# Patient Record
Sex: MALE | Race: BLACK OR AFRICAN AMERICAN | NOT HISPANIC OR LATINO | URBAN - METROPOLITAN AREA
[De-identification: names, ages, dates, MRNs, and addresses within clinical notes are randomized per-mention and may not be internally consistent; named-entity substitution may affect disease eponyms.]

---

## 2018-03-01 ENCOUNTER — OUTPATIENT (OUTPATIENT)
Dept: OUTPATIENT SERVICES | Facility: HOSPITAL | Age: 33
LOS: 1 days | End: 2018-03-01
Payer: MEDICAID

## 2018-03-01 PROCEDURE — G9001: CPT

## 2018-03-16 DIAGNOSIS — R69 ILLNESS, UNSPECIFIED: ICD-10-CM

## 2020-12-18 ENCOUNTER — EMERGENCY (EMERGENCY)
Facility: HOSPITAL | Age: 35
LOS: 1 days | Discharge: LEFT BEFORE DISCHARGE | End: 2020-12-18
Attending: EMERGENCY MEDICINE | Admitting: EMERGENCY MEDICINE
Payer: MEDICAID

## 2020-12-18 VITALS
RESPIRATION RATE: 16 BRPM | SYSTOLIC BLOOD PRESSURE: 224 MMHG | DIASTOLIC BLOOD PRESSURE: 112 MMHG | TEMPERATURE: 98 F | HEART RATE: 85 BPM | WEIGHT: 315 LBS | HEIGHT: 74 IN | OXYGEN SATURATION: 98 %

## 2020-12-18 LAB
ALBUMIN SERPL ELPH-MCNC: 3.5 G/DL — SIGNIFICANT CHANGE UP (ref 3.3–5)
ALP SERPL-CCNC: 91 U/L — SIGNIFICANT CHANGE UP (ref 40–120)
ALT FLD-CCNC: 41 U/L — SIGNIFICANT CHANGE UP (ref 12–78)
ANION GAP SERPL CALC-SCNC: 7 MMOL/L — SIGNIFICANT CHANGE UP (ref 5–17)
AST SERPL-CCNC: 24 U/L — SIGNIFICANT CHANGE UP (ref 15–37)
BASOPHILS # BLD AUTO: 0.05 K/UL — SIGNIFICANT CHANGE UP (ref 0–0.2)
BASOPHILS NFR BLD AUTO: 0.4 % — SIGNIFICANT CHANGE UP (ref 0–2)
BILIRUB SERPL-MCNC: 0.3 MG/DL — SIGNIFICANT CHANGE UP (ref 0.2–1.2)
BUN SERPL-MCNC: 18 MG/DL — SIGNIFICANT CHANGE UP (ref 7–23)
CALCIUM SERPL-MCNC: 9.1 MG/DL — SIGNIFICANT CHANGE UP (ref 8.5–10.1)
CHLORIDE SERPL-SCNC: 105 MMOL/L — SIGNIFICANT CHANGE UP (ref 96–108)
CO2 SERPL-SCNC: 26 MMOL/L — SIGNIFICANT CHANGE UP (ref 22–31)
CREAT SERPL-MCNC: 1.1 MG/DL — SIGNIFICANT CHANGE UP (ref 0.5–1.3)
D DIMER BLD IA.RAPID-MCNC: <150 NG/ML DDU — SIGNIFICANT CHANGE UP
EOSINOPHIL # BLD AUTO: 0.22 K/UL — SIGNIFICANT CHANGE UP (ref 0–0.5)
EOSINOPHIL NFR BLD AUTO: 1.7 % — SIGNIFICANT CHANGE UP (ref 0–6)
GLUCOSE SERPL-MCNC: 101 MG/DL — HIGH (ref 70–99)
HCT VFR BLD CALC: 44.3 % — SIGNIFICANT CHANGE UP (ref 39–50)
HGB BLD-MCNC: 14.4 G/DL — SIGNIFICANT CHANGE UP (ref 13–17)
IMM GRANULOCYTES NFR BLD AUTO: 0.5 % — SIGNIFICANT CHANGE UP (ref 0–1.5)
LIDOCAIN IGE QN: 132 U/L — SIGNIFICANT CHANGE UP (ref 73–393)
LYMPHOCYTES # BLD AUTO: 26.1 % — SIGNIFICANT CHANGE UP (ref 13–44)
LYMPHOCYTES # BLD AUTO: 3.41 K/UL — HIGH (ref 1–3.3)
MAGNESIUM SERPL-MCNC: 2.4 MG/DL — SIGNIFICANT CHANGE UP (ref 1.6–2.6)
MCHC RBC-ENTMCNC: 27.1 PG — SIGNIFICANT CHANGE UP (ref 27–34)
MCHC RBC-ENTMCNC: 32.5 GM/DL — SIGNIFICANT CHANGE UP (ref 32–36)
MCV RBC AUTO: 83.3 FL — SIGNIFICANT CHANGE UP (ref 80–100)
MONOCYTES # BLD AUTO: 0.88 K/UL — SIGNIFICANT CHANGE UP (ref 0–0.9)
MONOCYTES NFR BLD AUTO: 6.7 % — SIGNIFICANT CHANGE UP (ref 2–14)
NEUTROPHILS # BLD AUTO: 8.44 K/UL — HIGH (ref 1.8–7.4)
NEUTROPHILS NFR BLD AUTO: 64.6 % — SIGNIFICANT CHANGE UP (ref 43–77)
NRBC # BLD: 0 /100 WBCS — SIGNIFICANT CHANGE UP (ref 0–0)
NT-PROBNP SERPL-SCNC: 231 PG/ML — HIGH (ref 0–125)
PLATELET # BLD AUTO: 290 K/UL — SIGNIFICANT CHANGE UP (ref 150–400)
POTASSIUM SERPL-MCNC: 4.1 MMOL/L — SIGNIFICANT CHANGE UP (ref 3.5–5.3)
POTASSIUM SERPL-SCNC: 4.1 MMOL/L — SIGNIFICANT CHANGE UP (ref 3.5–5.3)
PROT SERPL-MCNC: 7.9 G/DL — SIGNIFICANT CHANGE UP (ref 6–8.3)
RBC # BLD: 5.32 M/UL — SIGNIFICANT CHANGE UP (ref 4.2–5.8)
RBC # FLD: 14.2 % — SIGNIFICANT CHANGE UP (ref 10.3–14.5)
SODIUM SERPL-SCNC: 138 MMOL/L — SIGNIFICANT CHANGE UP (ref 135–145)
TROPONIN I SERPL-MCNC: <.015 NG/ML — SIGNIFICANT CHANGE UP (ref 0.01–0.04)
WBC # BLD: 13.06 K/UL — HIGH (ref 3.8–10.5)
WBC # FLD AUTO: 13.06 K/UL — HIGH (ref 3.8–10.5)

## 2020-12-18 PROCEDURE — 99285 EMERGENCY DEPT VISIT HI MDM: CPT

## 2020-12-18 PROCEDURE — 93010 ELECTROCARDIOGRAM REPORT: CPT

## 2020-12-18 PROCEDURE — 71045 X-RAY EXAM CHEST 1 VIEW: CPT | Mod: 26

## 2020-12-18 PROCEDURE — 73030 X-RAY EXAM OF SHOULDER: CPT | Mod: 26,RT

## 2020-12-18 RX ORDER — MORPHINE SULFATE 50 MG/1
4 CAPSULE, EXTENDED RELEASE ORAL ONCE
Refills: 0 | Status: DISCONTINUED | OUTPATIENT
Start: 2020-12-18 | End: 2020-12-18

## 2020-12-18 RX ORDER — LABETALOL HCL 100 MG
10 TABLET ORAL ONCE
Refills: 0 | Status: COMPLETED | OUTPATIENT
Start: 2020-12-18 | End: 2020-12-18

## 2020-12-18 RX ADMIN — MORPHINE SULFATE 4 MILLIGRAM(S): 50 CAPSULE, EXTENDED RELEASE ORAL at 22:56

## 2020-12-18 RX ADMIN — MORPHINE SULFATE 4 MILLIGRAM(S): 50 CAPSULE, EXTENDED RELEASE ORAL at 22:22

## 2020-12-18 RX ADMIN — MORPHINE SULFATE 4 MILLIGRAM(S): 50 CAPSULE, EXTENDED RELEASE ORAL at 22:47

## 2020-12-18 RX ADMIN — MORPHINE SULFATE 4 MILLIGRAM(S): 50 CAPSULE, EXTENDED RELEASE ORAL at 23:19

## 2020-12-18 RX ADMIN — Medication 10 MILLIGRAM(S): at 23:42

## 2020-12-18 NOTE — ED ADULT NURSE NOTE - OBJECTIVE STATEMENT
Pt comes from triage. Pt reports right shoulder pain radiating down arm for the past month, describes pain and throbbing with tightness to fingers. Pt able to move arm and fingers. Pt breathing easy, unlabored, no signs of distress.

## 2020-12-18 NOTE — ED ADULT TRIAGE NOTE - CHIEF COMPLAINT QUOTE
patient came in ED with c/o elevated BP X noted today and right arm pain X a month. sent by Dr Lynn.

## 2020-12-18 NOTE — ED PROVIDER NOTE - CARE PROVIDER_API CALL
All Delaney  CARDIOVASCULAR DISEASE  [address], [address]  [place] [address]  Phone: [phone]  Fax: [phone]  Follow Up Time: 1-3 Days    Dom Gonzalez  ORTHOPAEDIC SURGERY  [address], [address]  [place] [address]  Phone: [phone]  Fax: [phone]  Follow Up Time: 4-6 Days

## 2020-12-18 NOTE — ED PROVIDER NOTE - OBJECTIVE STATEMENT
Pt is a 36 yo male smoker, hx htn in past on no meds, pt p/w several months of progressive right shoulder pain down right arm, no weakness,. pt went to [facility] for evaluation and bp was very high and came to er.  pt state pain 9/10,  pt sleeps on right shoulder, no known trauma, pt states some pain with rom. pt denies radiation to chest, back, legs , no numbness or weakness. pt states pain now severe, no ha.

## 2020-12-18 NOTE — ED PROVIDER NOTE - PROGRESS NOTE DETAILS
pain improved to 4/10 but pt still with elevated hbp. pt notified that his blood pressure is severely high and is an emergent threat to his life at this level.  pt recommended ct of chest to ro aortic pathology and further bp management as well as cardiology consult in am to arrange blood pressure regimen for patient as well as follow up. pt is aware that refusing such treatment may lead to stroke, intr pt notified that his blood pressure is severely high and is an emergent threat to his life at this level.  pt recommended ct of chest to ro aortic pathology and further bp management as well as cardiology consult in am to arrange blood pressure regimen for patient as well as follow up. pt is aware that refusing such treatment may lead to stroke, intracranial hemorrhage, renal failure and hemodialysis, aortic tear, heart attack and even death.  pt refuses to stay for treatment despite these risks.

## 2020-12-18 NOTE — ED PROVIDER NOTE - PATIENT PORTAL LINK FT
You can access the FollowMyHealth Patient Portal offered by [facility] by registering at the following website: [email or web address]. By joining [employer]’s FollowMyHealth portal, you will also be able to view your health information using other applications (apps) compatible with our system.

## 2020-12-18 NOTE — ED PROVIDER NOTE - MUSCULOSKELETAL, MLM
Spine appears normal, range of motion is not limited, right shoulder ttp over biceps tenden, anterior rotator cuff region

## 2020-12-18 NOTE — ED PROVIDER NOTE - PROVIDER TOKENS
PROVIDER:[TOKEN:[[id number]],FOLLOWUP:[1-3 Days]],PROVIDER:[TOKEN:[[id number]],FOLLOWUP:[4-6 Days]]

## 2020-12-18 NOTE — ED PROVIDER NOTE - ATTESTATION, MLM
Bladder scan showing 700 cc, patient refusing to be straight cath or void. States that will void soon. Education proved.    I have reviewed and confirmed nurses' notes for patient's medications, allergies, medical history, and surgical history.

## 2020-12-18 NOTE — ED PROVIDER NOTE - CLINICAL SUMMARY MEDICAL DECISION MAKING FREE TEXT BOX
Pt is 34 yo male with progressive right shoulder pain over month, no trauma, pt with severe htn,  pan controlled but bp remained, check trop dd x 1, cr, cta ro dissection, xray shoulder ok,  pain likely from shoulder,  manage bp,  if cta neg, d/c on bp meds, and cotho fu as outpt.  if bp hard to control,  card consult in am

## 2020-12-18 NOTE — ED PROVIDER NOTE - CONSTITUTIONAL, MLM
obese, large, male, awake, alert, oriented to person, place, time/situation and in no pain, anxious normal...

## 2020-12-18 NOTE — ED PROVIDER NOTE - NSFOLLOWUPINSTRUCTIONS_ED_ALL_ED_FT
sling for shoulder pain,   FOLLOW UP IMMEDIATELY WITH YOUR PRIMARY OR DR DUKES OF CARDIOLOGY FOR BLOOD PRESSURE CONTROL.  take tylenol as directed over the counter for pain,   call ortho for follow up of shoulder pain within one week.  return for headache, chest pain, slurred speech, numbness, weakness. pt notified that his blood pressure is severely high and is an emergent threat to his life at this level.  pt recommended ct of chest to ro aortic pathology and further bp management as well as cardiology consult in am to arrange blood pressure regimen for patient as well as follow up. pt is aware that refusing such treatment may lead to stroke, intracranial hemorrhage, renal failure and hemodialysis, aortic tear, heart attack and even death.  pt refuses to stay for treatment despite these risks.

## 2020-12-19 VITALS
TEMPERATURE: 98 F | DIASTOLIC BLOOD PRESSURE: 125 MMHG | OXYGEN SATURATION: 98 % | HEART RATE: 71 BPM | SYSTOLIC BLOOD PRESSURE: 217 MMHG | RESPIRATION RATE: 18 BRPM

## 2020-12-19 PROCEDURE — 73030 X-RAY EXAM OF SHOULDER: CPT

## 2020-12-19 PROCEDURE — 36415 COLL VENOUS BLD VENIPUNCTURE: CPT

## 2020-12-19 PROCEDURE — 96375 TX/PRO/DX INJ NEW DRUG ADDON: CPT

## 2020-12-19 PROCEDURE — 99284 EMERGENCY DEPT VISIT MOD MDM: CPT | Mod: 25

## 2020-12-19 PROCEDURE — 85379 FIBRIN DEGRADATION QUANT: CPT

## 2020-12-19 PROCEDURE — 84484 ASSAY OF TROPONIN QUANT: CPT

## 2020-12-19 PROCEDURE — 83880 ASSAY OF NATRIURETIC PEPTIDE: CPT

## 2020-12-19 PROCEDURE — 83690 ASSAY OF LIPASE: CPT

## 2020-12-19 PROCEDURE — 80053 COMPREHEN METABOLIC PANEL: CPT

## 2020-12-19 PROCEDURE — 83735 ASSAY OF MAGNESIUM: CPT

## 2020-12-19 PROCEDURE — 85025 COMPLETE CBC W/AUTO DIFF WBC: CPT

## 2020-12-19 PROCEDURE — 93005 ELECTROCARDIOGRAM TRACING: CPT

## 2020-12-19 PROCEDURE — 96376 TX/PRO/DX INJ SAME DRUG ADON: CPT

## 2020-12-19 PROCEDURE — 71045 X-RAY EXAM CHEST 1 VIEW: CPT

## 2020-12-19 PROCEDURE — 96374 THER/PROPH/DIAG INJ IV PUSH: CPT

## 2020-12-19 RX ORDER — HYDRALAZINE HCL 50 MG
10 TABLET ORAL ONCE
Refills: 0 | Status: COMPLETED | OUTPATIENT
Start: 2020-12-19 | End: 2020-12-19

## 2020-12-19 RX ADMIN — Medication 10 MILLIGRAM(S): at 00:41

## 2020-12-19 NOTE — ED ADULT NURSE REASSESSMENT NOTE - NS ED NURSE REASSESS COMMENT FT1
Pt does not want to stay until the morning for cardiology consult, declining CT scans as well. MD Rosario at bedside to explain risks of leaving against medical advice, pt verbalized understanding, signed AMA form. Pt received discharge papers, IV removed. Pt ambulatory with a steady gait, being driven home by mother.

## 2021-06-10 NOTE — ED ADULT NURSE NOTE - DISTAL EXTREMITY CAPILLARY REFILL
Chief Complaint   Patient presents with     Consult     [facility] NEW - closed fracture of lumbar vertebra     Fab Meléndez    
2 seconds or less

## 2025-03-12 ENCOUNTER — EMERGENCY (EMERGENCY)
Facility: HOSPITAL | Age: 40
LOS: 1 days | Discharge: LEFT BEFORE DISCHARGE | End: 2025-03-12
Attending: EMERGENCY MEDICINE
Payer: COMMERCIAL

## 2025-03-12 VITALS
DIASTOLIC BLOOD PRESSURE: 133 MMHG | HEART RATE: 78 BPM | RESPIRATION RATE: 18 BRPM | OXYGEN SATURATION: 95 % | TEMPERATURE: 98 F | SYSTOLIC BLOOD PRESSURE: 209 MMHG

## 2025-03-12 VITALS
HEART RATE: 72 BPM | RESPIRATION RATE: 18 BRPM | SYSTOLIC BLOOD PRESSURE: 213 MMHG | DIASTOLIC BLOOD PRESSURE: 147 MMHG | OXYGEN SATURATION: 99 %

## 2025-03-12 LAB
ALBUMIN SERPL ELPH-MCNC: 3.8 G/DL — SIGNIFICANT CHANGE UP (ref 3.3–5.2)
ALP SERPL-CCNC: 89 U/L — SIGNIFICANT CHANGE UP (ref 40–120)
ALT FLD-CCNC: 15 U/L — SIGNIFICANT CHANGE UP
ANION GAP SERPL CALC-SCNC: 11 MMOL/L — SIGNIFICANT CHANGE UP (ref 5–17)
AST SERPL-CCNC: 16 U/L — SIGNIFICANT CHANGE UP
BASOPHILS # BLD AUTO: 0.06 K/UL — SIGNIFICANT CHANGE UP (ref 0–0.2)
BASOPHILS NFR BLD AUTO: 0.6 % — SIGNIFICANT CHANGE UP (ref 0–2)
BILIRUB SERPL-MCNC: 0.4 MG/DL — SIGNIFICANT CHANGE UP (ref 0.4–2)
BUN SERPL-MCNC: 12.1 MG/DL — SIGNIFICANT CHANGE UP (ref 8–20)
CALCIUM SERPL-MCNC: 9.1 MG/DL — SIGNIFICANT CHANGE UP (ref 8.4–10.5)
CHLORIDE SERPL-SCNC: 100 MMOL/L — SIGNIFICANT CHANGE UP (ref 96–108)
CO2 SERPL-SCNC: 26 MMOL/L — SIGNIFICANT CHANGE UP (ref 22–29)
CREAT SERPL-MCNC: 0.96 MG/DL — SIGNIFICANT CHANGE UP (ref 0.5–1.3)
EGFR: 103 ML/MIN/1.73M2 — SIGNIFICANT CHANGE UP
EGFR: 103 ML/MIN/1.73M2 — SIGNIFICANT CHANGE UP
EOSINOPHIL # BLD AUTO: 0.16 K/UL — SIGNIFICANT CHANGE UP (ref 0–0.5)
EOSINOPHIL NFR BLD AUTO: 1.5 % — SIGNIFICANT CHANGE UP (ref 0–6)
GLUCOSE SERPL-MCNC: 88 MG/DL — SIGNIFICANT CHANGE UP (ref 70–99)
HCT VFR BLD CALC: 44.6 % — SIGNIFICANT CHANGE UP (ref 39–50)
HGB BLD-MCNC: 14.3 G/DL — SIGNIFICANT CHANGE UP (ref 13–17)
IMM GRANULOCYTES # BLD AUTO: 0.05 K/UL — SIGNIFICANT CHANGE UP (ref 0–0.07)
IMM GRANULOCYTES NFR BLD AUTO: 0.5 % — SIGNIFICANT CHANGE UP (ref 0–0.9)
LYMPHOCYTES # BLD AUTO: 2.93 K/UL — SIGNIFICANT CHANGE UP (ref 1–3.3)
LYMPHOCYTES NFR BLD AUTO: 26.9 % — SIGNIFICANT CHANGE UP (ref 13–44)
MCHC RBC-ENTMCNC: 26.6 PG — LOW (ref 27–34)
MCHC RBC-ENTMCNC: 32.1 G/DL — SIGNIFICANT CHANGE UP (ref 32–36)
MCV RBC AUTO: 83.1 FL — SIGNIFICANT CHANGE UP (ref 80–100)
MONOCYTES # BLD AUTO: 0.66 K/UL — SIGNIFICANT CHANGE UP (ref 0–0.9)
MONOCYTES NFR BLD AUTO: 6.1 % — SIGNIFICANT CHANGE UP (ref 2–14)
NEUTROPHILS # BLD AUTO: 7.04 K/UL — SIGNIFICANT CHANGE UP (ref 1.8–7.4)
NEUTROPHILS NFR BLD AUTO: 64.4 % — SIGNIFICANT CHANGE UP (ref 43–77)
NRBC # BLD AUTO: 0 K/UL — SIGNIFICANT CHANGE UP (ref 0–0)
NRBC # FLD: 0 K/UL — SIGNIFICANT CHANGE UP (ref 0–0)
NRBC BLD AUTO-RTO: 0 /100 WBCS — SIGNIFICANT CHANGE UP (ref 0–0)
PLATELET # BLD AUTO: 286 K/UL — SIGNIFICANT CHANGE UP (ref 150–400)
PMV BLD: 9.6 FL — SIGNIFICANT CHANGE UP (ref 7–13)
POTASSIUM SERPL-MCNC: 4 MMOL/L — SIGNIFICANT CHANGE UP (ref 3.5–5.3)
POTASSIUM SERPL-SCNC: 4 MMOL/L — SIGNIFICANT CHANGE UP (ref 3.5–5.3)
PROT SERPL-MCNC: 7.1 G/DL — SIGNIFICANT CHANGE UP (ref 6.6–8.7)
RBC # BLD: 5.37 M/UL — SIGNIFICANT CHANGE UP (ref 4.2–5.8)
RBC # FLD: 14.4 % — SIGNIFICANT CHANGE UP (ref 10.3–14.5)
SODIUM SERPL-SCNC: 137 MMOL/L — SIGNIFICANT CHANGE UP (ref 135–145)
WBC # BLD: 10.9 K/UL — HIGH (ref 3.8–10.5)
WBC # FLD AUTO: 10.9 K/UL — HIGH (ref 3.8–10.5)

## 2025-03-12 PROCEDURE — 99284 EMERGENCY DEPT VISIT MOD MDM: CPT | Mod: 25

## 2025-03-12 PROCEDURE — 99284 EMERGENCY DEPT VISIT MOD MDM: CPT

## 2025-03-12 PROCEDURE — 96374 THER/PROPH/DIAG INJ IV PUSH: CPT

## 2025-03-12 PROCEDURE — 85025 COMPLETE CBC W/AUTO DIFF WBC: CPT

## 2025-03-12 PROCEDURE — 80053 COMPREHEN METABOLIC PANEL: CPT

## 2025-03-12 PROCEDURE — 36415 COLL VENOUS BLD VENIPUNCTURE: CPT

## 2025-03-12 RX ORDER — AMLODIPINE BESYLATE 10 MG/1
1 TABLET ORAL
Qty: 30 | Refills: 0
Start: 2025-03-12 | End: 2025-04-10

## 2025-03-12 RX ORDER — LABETALOL HYDROCHLORIDE 200 MG/1
10 TABLET, FILM COATED ORAL ONCE
Refills: 0 | Status: COMPLETED | OUTPATIENT
Start: 2025-03-12 | End: 2025-03-12

## 2025-03-12 RX ORDER — AMLODIPINE BESYLATE 10 MG/1
5 TABLET ORAL ONCE
Refills: 0 | Status: COMPLETED | OUTPATIENT
Start: 2025-03-12 | End: 2025-03-12

## 2025-03-12 RX ADMIN — AMLODIPINE BESYLATE 5 MILLIGRAM(S): 10 TABLET ORAL at 18:41

## 2025-03-12 RX ADMIN — AMLODIPINE BESYLATE 5 MILLIGRAM(S): 10 TABLET ORAL at 16:41

## 2025-03-12 RX ADMIN — LABETALOL HYDROCHLORIDE 10 MILLIGRAM(S): 200 TABLET, FILM COATED ORAL at 20:04

## 2025-03-12 NOTE — ED PROVIDER NOTE - ATTENDING CONTRIBUTION TO CARE
40 year old male presents with hypertension. Pt states he was at his orthopedist today and had systolic BP in 200s. States he was told he had hypertension 8 months ago but never took medication for it.  Will obtain basic labs to assess kidney function. Checked  and did not see antihypertensive medication prescribed. Will give dose of amlodipine and reassess.    I, Ralf Sheikh, performed the initial face to face bedside interview with this patient regarding history of present illness, review of symptoms and relevant past medical, social and family history.  I completed an independent physical examination.  I was the initial provider who evaluated this patient. I have signed out the follow up of any pending tests (i.e. labs, radiological studies) to the resident.  I have communicated the patient’s plan of care and disposition with the resident

## 2025-03-12 NOTE — ED PROVIDER NOTE - PATIENT PORTAL LINK FT
You can access the FollowMyHealth Patient Portal offered by [facility] by registering at the following website: [email or web address]. By joining [employer]’s FollowMyHealth portal, you will also be able to view your health information using other applications (apps) compatible with our system.
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
## 2025-03-12 NOTE — ED ADULT NURSE REASSESSMENT NOTE - NS ED NURSE REASSESS COMMENT FT1
pt requesting to leave, MD made aware, pt continues to be hypertensive, showing no signs of distress, denies vision changes, SOB, nausea, vomiting, or chest pain
report received from RN tiffany castro. pt axo3 with equal and unlabored resp, bp reassessed, continues to be high, MD SINCLAIR made aware, states he will put meds in

## 2025-03-12 NOTE — ED PROVIDER NOTE - CLINICAL SUMMARY MEDICAL DECISION MAKING FREE TEXT BOX
40 year old male presents with hypertension. Pt states he was at his orthopedist today and had systolic BP in 200s. States he was told he had hypertension 8 months ago but never took medication for it.  Will obtain basic labs to assess kidney function. Checked  and did not see antihypertensive mediaction prescribed. Will give dose of amlodipine and reassess. 40 year old male presents with hypertension. Pt states he was at his orthopedist today and had systolic BP in 200s. States he was told he had hypertension 8 months ago but never took medication for it.  Will obtain basic labs to assess kidney function. Checked  and did not see antihypertensive medication prescribed. Will give dose of amlodipine and reassess.    MD Joey: Sign out received from previous MD/DO. Patient left AMA.

## 2025-03-12 NOTE — ED PROVIDER NOTE - OBJECTIVE STATEMENT
40 year old male presents with hypertension. Pt states he was at his orthopedist today and had systolic BP in 200s. States he was told he had hypertension 8 months ago but never took medication for it. Denies chest pain, sob, vision changes, focal neuro deficit, weakness, or any other complaints at this time.

## 2025-03-12 NOTE — ED ADULT TRIAGE NOTE - CHIEF COMPLAINT QUOTE
pt BIBEMS from home c/o hypertension for several days, pt endorses he usually takes medication but ran out and has not been able to  a refill, neg. CP/SOB/HA/dizziness/vision changes, pt systolic 200's with EMS and in triage.

## 2025-03-12 NOTE — ED PROVIDER NOTE - PROGRESS NOTE DETAILS
BP responding to amlodipine - rx sent. Expressed the importance of following up with PCP for blood pressure control. Explained risk of stroke with untreated hypertension. Pt understands. Return precautions given.

## 2025-03-13 ENCOUNTER — EMERGENCY (EMERGENCY)
Facility: HOSPITAL | Age: 40
LOS: 1 days | End: 2025-03-13
Attending: STUDENT IN AN ORGANIZED HEALTH CARE EDUCATION/TRAINING PROGRAM
Payer: COMMERCIAL

## 2025-03-13 VITALS
HEIGHT: 73 IN | RESPIRATION RATE: 16 BRPM | DIASTOLIC BLOOD PRESSURE: 133 MMHG | OXYGEN SATURATION: 94 % | WEIGHT: 315 LBS | TEMPERATURE: 98 F | HEART RATE: 98 BPM | SYSTOLIC BLOOD PRESSURE: 197 MMHG

## 2025-03-13 VITALS
OXYGEN SATURATION: 95 % | HEART RATE: 64 BPM | RESPIRATION RATE: 20 BRPM | DIASTOLIC BLOOD PRESSURE: 98 MMHG | SYSTOLIC BLOOD PRESSURE: 168 MMHG | TEMPERATURE: 98 F

## 2025-03-13 PROCEDURE — 93010 ELECTROCARDIOGRAM REPORT: CPT

## 2025-03-13 PROCEDURE — 82962 GLUCOSE BLOOD TEST: CPT

## 2025-03-13 PROCEDURE — 99285 EMERGENCY DEPT VISIT HI MDM: CPT

## 2025-03-13 PROCEDURE — 93005 ELECTROCARDIOGRAM TRACING: CPT

## 2025-03-13 PROCEDURE — 70450 CT HEAD/BRAIN W/O DYE: CPT | Mod: 26

## 2025-03-13 PROCEDURE — 70450 CT HEAD/BRAIN W/O DYE: CPT | Mod: MC

## 2025-03-13 PROCEDURE — 99285 EMERGENCY DEPT VISIT HI MDM: CPT | Mod: 25

## 2025-03-13 NOTE — ED PROVIDER NOTE - ATTENDING CONTRIBUTION TO CARE
40y M w/ hx HTN presents after syncopal episode. Says he had not eaten all day and was smoking outside when he felt dizzy and passed out. CT head negative, EKG showing LVH without acute ischemic changes. Pt was just seen in this ED yesterday for elevated BP and had labs done that were unremarkable. Pt was evaluated by resident but eloped from the ED prior to my assessment, without having repeat lab work done.

## 2025-03-13 NOTE — ED ADULT TRIAGE NOTE - CHIEF COMPLAINT QUOTE
Arrives to ED following syncopal episode 20 minutes ago. "I was walking into by building after smoking cigarette and I passed out". Pt denies head strike. Pt states he feels sob, and has bilateral eye blurriness. Pt is hypertensive and states he was diagnosed with htn yesterday. Md Loyd at bedside, Priority CT called.

## 2025-03-13 NOTE — ED PROVIDER NOTE - CLINICAL SUMMARY MEDICAL DECISION MAKING FREE TEXT BOX
40 year old male with pmhx of htn presents s/p syncope. Pt states he was smoking a cigarette, walked up the stairs afterward and became lightheaded and syncopized.   CT head neg for acute pathology, EKG with LVH. Labs and fluids ordered, however patient eloped prior to labs being drawn.

## 2025-03-13 NOTE — ED PROVIDER NOTE - OBJECTIVE STATEMENT
40 year old male with pmhx of htn presents s/p syncope. Pt states he was smoking a cigarette, walked up the stairs afterward and became lightheaded and syncopized. Denies head strike. Pt states all he has had to eat in the past 2 days is a fruit cup and juice with cayenne pepper. Pt states he is still feeling lightheaded. Denies chest reji, sob, fever, cough, leg swelling, recent travel or any other complaints at this time

## 2025-03-13 NOTE — ED ADULT TRIAGE NOTE - BP NONINVASIVE DIASTOLIC (MM HG)
Prior Authorization Approval    Authorization Effective Date: 2/12/2020  Authorization Expiration Date: 5/12/2023  Medication: Humira  Approved Dose/Quantity: 2/28ds  Reference #:     Insurance Company: [employer] Part D - Phone [phone] Fax [phone]  Expected CoPay: $100     CoPay Card Available: No    Foundation Assistance Needed: does not qualify   Which Pharmacy is filling the prescription (Not needed for infusion/clinic administered): [facility] - [place], [place] - [address]  Pharmacy Notified: Yes  Patient Notified: Yes     [id number]